# Patient Record
Sex: MALE | Race: WHITE | URBAN - METROPOLITAN AREA
[De-identification: names, ages, dates, MRNs, and addresses within clinical notes are randomized per-mention and may not be internally consistent; named-entity substitution may affect disease eponyms.]

---

## 2023-08-04 ENCOUNTER — TELEPHONE (OUTPATIENT)
Age: 38
End: 2023-08-04

## 2023-08-04 NOTE — TELEPHONE ENCOUNTER
Telephone call to patient to discuss reason for visit and inquire regarding records. Patient states that around 4 years ago his wife was undergoing prenatal genetic screening. As part of the prenatal evaluation for the family he opted to undergo a multi gene panel through [facility]. 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 He sees both dermatology and surgical oncology. Dr Letitia Garcia who orders and monitors yearly MRI    His wife met with a new OB recently and the genetic counselor at the new OB office suggested he explore a second opinion regarding this genetic finding. Patient will send records from [place]. We discussed option of sharing at [facility] genetic tumor board. Patient provided verbal permission to proceed with this option. Three generation pedigree collected during phone call this evening. The patient was given time to ask questions. All questions answered. The patient communicated an understanding of and agreement with the plan.

## 2023-08-22 ENCOUNTER — OFFICE VISIT (OUTPATIENT)
Age: 38
End: 2023-08-22

## 2023-08-22 DIAGNOSIS — Z71.83 ENCOUNTER FOR NONPROCREATIVE GENETIC COUNSELING AND TESTING: ICD-10-CM

## 2023-08-22 DIAGNOSIS — Z80.0: ICD-10-CM

## 2023-08-22 DIAGNOSIS — Z13.71 ENCOUNTER FOR NONPROCREATIVE GENETIC COUNSELING AND TESTING: ICD-10-CM

## 2023-08-22 DIAGNOSIS — Z14.8 CARRIER OF GENE MUTATION FOR HIGH RISK OF CANCER: ICD-10-CM

## 2023-08-22 DIAGNOSIS — R89.9 ABNORMAL LABORATORY TEST RESULT: Primary | ICD-10-CM

## 2023-08-22 DIAGNOSIS — Z84.81 FHX: GENETIC DISEASE CARRIER: ICD-10-CM

## 2023-08-22 NOTE — PROGRESS NOTES
[place] [facility] at [facility]  Post-Test Genetic Test Discussion      Name: Zenaida Jenkins  Age: 41 y.o.  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 FH is a gene coding for the Francois cycle protein fumarate hydratase (FH). FH carriers are at increased risk of renal cell cancer and cutaneous and uterine smooth muscle benign tumours called leiomyomas. Additionally, an association with pheochromoytoma/para-ganglioma (PCC/PGL) has been reported. Following the patient's carrier screening test results in  he was referred to Kimberly Paul, [address] [place], [place] at [address] to discuss risks and implications associated with this FH pathogenic variant. The patient underwent comprehensive post test genetic counseling by Mr. Silvia Desir. Frank Marlin Danile detailed report has been scanned into the patient's EMR for [facility] in the medial section or tab. Notably, in 2019, at the time of the patient's consultation with Mr. Silvia Desir, the patient's personal and family history were negative for signs of HLRCC. The patient shares that he has been undergoing yearly MRI screening of the abdomen (with and without contrast) managed by Dr. Marianela Goode of [facility] following identification of this   [address] finding. He states that he also follows with Dr. Vick Perla for  yearly skin examinations. Per patient report surveillance has not yielded any imaging or physical exam findings suggestive of or worrisome for HLRCC to date. The patient also has a history of B-thalassemia. He reports his daughter has experienced issues with microcytic anemia.  He explains she is scheduled to meet with a pediatric hematology oncology physician at [facility] in the near future and expects she will undergo additional genetic testing for this